# Patient Record
(demographics unavailable — no encounter records)

---

## 2024-11-14 NOTE — ASSESSMENT
[FreeTextEntry1] : CARLY is a 18 month old boy presenting for noisy breathing, laryngomalacia, stridor, vascular ring? dysphagia to solids CXR Mag Airway Airway Fluoro within normal limits 9/20/24  laryngomalacia persistent, stridor present worse with exertion - scheduled for possible endoscopic intervention possible supraglottoplasty (SDA PCP)  - imaging options discussed - supraglottoplasty at time of DLB if no other significant treatable pathology found - recording with dysphagia to solids with obvious choking, plan for esophagram as symptoms now supportive of possible vascular ring  Consent for Direct Laryngoscopy and Bronchoscopy The risks, benefits and alternatives of direct laryngoscopy and bronchoscopy were discussed. Risks including but not limited to pain, bleeding, swelling, infection, scarring damage to lips, teeth, gums, parts of the oral cavity, oropharynx and airway, risk for further procedures, and risk of anesthesia (which will be discussed with you by the anesthesiologist).  Benefits include the diagnosis or surveillance of an underlying condition and treatment of an underlying condition. Alternatives include observation, and other diagnostic studies. During observation, if there is an underlying condition there is a risk that it could progress. Photodocumentation including pictures and video with or without sound may be taken, and effort will be made to maintain respect for privacy and confidentiality.

## 2024-11-14 NOTE — CONSULT LETTER
[Dear  ___] : Dear  [unfilled], [Courtesy Letter:] : I had the pleasure of seeing your patient, [unfilled], in my office today. [Please see my note below.] : Please see my note below. [Consult Closing:] : Thank you very much for allowing me to participate in the care of this patient.  If you have any questions, please do not hesitate to contact me. [Sincerely,] : Sincerely, [FreeTextEntry2] : Dr. Kyle Monsalve  [address] [place] [address] [phone] [FreeTextEntry3] : Jazmin Reyna MD Pediatric Otolaryngology / Head and Neck Surgery    [facility] [address] [place] [address] Tel [phone] Fax [phone]    [facility], [facility] [address] [place] [address]  Tel [phone] Fax [phone]

## 2024-11-14 NOTE — PHYSICAL EXAM
[2+] : 2+ [Inspriatory] : inspiratory stridor [Normal Gait and Station] : normal gait and station [Normal muscle strength, symmetry and tone of facial, head and neck musculature] : normal muscle strength, symmetry and tone of facial, head and neck musculature [Normal] : no cervical lymphadenopathy [Effusion] : no effusion [Exposed Vessel] : left anterior vessel not exposed [Increased Work of Breathing] : no increased work of breathing with use of accessory muscles and retractions

## 2024-11-14 NOTE — HISTORY OF PRESENT ILLNESS
[de-identified] : Today I had the pleasure of seeing CARLY BLUE for follow up.  History was obtained from patient, mother and chart.  CXR flouro and mag airway 9/20/24 within normal limits  Scheduled for DLB possible endoscopic intervention possible supraglottoplasty 12/23/24 [de-identified] : sleeping okay, sound is improving, worse when walking too much or running too much, no recent illness, no cyanosis or retractions no ear infections not sure if snoring difficulty with swallowing solids, needs water to help it, no issues with drinking

## 2024-12-07 NOTE — HISTORY OF PRESENT ILLNESS
[de-identified] : 19 month old male with laryngomalacia presents for evaluation of feeding problems.  Referred by Dr. Reyna, ENT.  Normal esophagram. 4/23 supraglottoplasty at time of DLB. Notes that he is sucking on fruit but wouldn't chew and swallow.  After all the juice has been sucked out of the fruit he spits out the fibrous product without attempting to swallow. Has a pronounced swallow, where it appears that he is requiring extra effort to push the food bolus down.  Can eat a peeled tomato, peeled grapes, avocado, banana, berries, oranges. Pockets bread in front of mouth, as well as pasta, rice, lentils, fries, cookies. Ingests smoothies and purrees. Uses a lot of water with eating and whole milk. Multiple teeth. Doesn't appear painful to chew. No emesis. No choking, coughing or gagging with solids or liquids. No recurrent infections, URI, PNA, abx use. No constipation or diarrhea no visible blood or mucous. Good UOP.

## 2024-12-07 NOTE — HISTORY OF PRESENT ILLNESS
[de-identified] : 19 month old male with laryngomalacia presents for evaluation of feeding problems.  Referred by Dr. Reyna, ENT.  Normal esophagram. 4/23 supraglottoplasty at time of DLB. Notes that he is sucking on fruit but wouldn't chew and swallow.  After all the juice has been sucked out of the fruit he spits out the fibrous product without attempting to swallow. Has a pronounced swallow, where it appears that he is requiring extra effort to push the food bolus down.  Can eat a peeled tomato, peeled grapes, avocado, banana, berries, oranges. Pockets bread in front of mouth, as well as pasta, rice, lentils, fries, cookies. Ingests smoothies and purrees. Uses a lot of water with eating and whole milk. Multiple teeth. Doesn't appear painful to chew. No emesis. No choking, coughing or gagging with solids or liquids. No recurrent infections, URI, PNA, abx use. No constipation or diarrhea no visible blood or mucous. Good UOP.

## 2024-12-07 NOTE — CONSULT LETTER
[Dear  ___] : Dear  [unfilled], [Consult Letter:] : I had the pleasure of evaluating your patient, [unfilled]. [Please see my note below.] : Please see my note below. [Consult Closing:] : Thank you very much for allowing me to participate in the care of this patient.  If you have any questions, please do not hesitate to contact me. [Sincerely,] : Sincerely, [DrClark  ___] : Dr. KWAN [FreeTextEntry3] : Abilio Ortega DO, MSc   of Comprehensive Airway, Respiratory and Esophageal Team Division of Pediatric Gastroenterology, Liver Disease and Nutrition [facility]

## 2024-12-07 NOTE — HISTORY OF PRESENT ILLNESS
[de-identified] : 19 month old male with laryngomalacia presents for evaluation of feeding problems.  Referred by Dr. Reyna, ENT.  Normal esophagram. 4/23 supraglottoplasty at time of DLB. Notes that he is sucking on fruit but wouldn't chew and swallow.  After all the juice has been sucked out of the fruit he spits out the fibrous product without attempting to swallow. Has a pronounced swallow, where it appears that he is requiring extra effort to push the food bolus down.  Can eat a peeled tomato, peeled grapes, avocado, banana, berries, oranges. Pockets bread in front of mouth, as well as pasta, rice, lentils, fries, cookies. Ingests smoothies and purrees. Uses a lot of water with eating and whole milk. Multiple teeth. Doesn't appear painful to chew. No emesis. No choking, coughing or gagging with solids or liquids. No recurrent infections, URI, PNA, abx use. No constipation or diarrhea no visible blood or mucous. Good UOP.

## 2024-12-07 NOTE — CONSULT LETTER
[Dear  ___] : Dear  [unfilled], [Consult Letter:] : I had the pleasure of evaluating your patient, [unfilled]. [Please see my note below.] : Please see my note below. [Consult Closing:] : Thank you very much for allowing me to participate in the care of this patient.  If you have any questions, please do not hesitate to contact me. [Sincerely,] : Sincerely, [DrClark  ___] : Dr. KWAN [FreeTextEntry3] : Abilio Ortega DO, MSc   of Comprehensive Airway, Respiratory and Esophageal Team Division of Pediatric Gastroenterology, Liver Disease and Nutrition [facility]

## 2024-12-07 NOTE — ASSESSMENT
[Educated Patient & Family about Diagnosis] : educated the patient and family about the diagnosis [FreeTextEntry1] : 19 month old male with laryngomalacia presents for evaluation of feeding problems that may be related to oropharyngeal dysphagia; however, esophagitis should be excluded.  Referred by Dr. Reyna, ENT.  Recommend: -Clinical swallow evaluation today -EGD with biopsies at time of intervention with Dr. Reyna in OR -Call with questions, concerns, or clinical change -Follow-up dependent upon timeline to procedure and results of EGD

## 2024-12-07 NOTE — CONSULT LETTER
[Dear  ___] : Dear  [unfilled], [Consult Letter:] : I had the pleasure of evaluating your patient, [unfilled]. [Please see my note below.] : Please see my note below. [Consult Closing:] : Thank you very much for allowing me to participate in the care of this patient.  If you have any questions, please do not hesitate to contact me. [Sincerely,] : Sincerely, [DrClark  ___] : Dr. KWAN [FreeTextEntry3] : Abilio Ortega DO, MSc   of Comprehensive Airway, Respiratory and Esophageal Team Division of Pediatric Gastroenterology, Liver Disease and Nutrition [facility]a [facility]

## 2024-12-10 NOTE — ASSESSMENT
[FreeTextEntry1] : PEDIATRIC CLINICAL SWALLOW EVALUATION  Type of Evaluation & Procedure Code: Evaluation of Oral and Pharyngeal Swallow CPT 53563   Patient Name: Dickson Lemus   D.O.B: 23  Date of Evaluation: 24  Referring Physician: Dr. Jazmin Reyna   Referring Physician Specialty: Otolaryngologist   Medical Diagnosis: Laryngomalacia Q31.5  Treatment Diagnosis: Oropharyngeal Dysphagia (R13.12)  REASON FOR REFERRAL: Dickson Lemus, a 19 month old male was referred by Otolaryngologist, Dr. Jazmin Reyna for a Clinical Swallow Evaluation to further assess oral and pharyngeal swallow as parent reported difficulties with solids. Patient was accompanied by parents who provided the case history information and served as reliable informants.    PRIMARY LANGUAGE:  Reported Primary Language: English   BIRTH & MEDICAL HISTORY: Birth and medical history was gathered via caregiver interview and review of electronic medical record. Patient was born full term via ceserean delivery. Born at [facility] however transferred to [facility] NICU for 10 days due to episodes of cyanosis. [place] Hearing Screening passed. Patient's medical history is significant for noisy breathing, laryngomalacia, stridor. Per ENT appointment on 24, patient is scheduled for DLB possible endoscopic intervention possible supraglottoplasty 24.  Per GI appointment on 24, reported "normal esophagram". Denies any recent hospitalization/surgeries. Denies any recent URIs/pneumonia diagnoses.    Medical Allergies: None reported.  Food Allergies: None reported    Current respiratory status: room air    Results of Previous Clinical Swallow Evaluations:   None reported    Results of Most Recent Modified Barium Swallow Study (MBSS)/Videofluoroscopic Swallow Studies (VFSS):   None reported    DEVELOPMENTAL MILESTONES: Per parent report, patient is not participating in any therapeutic interventions.    FEEDING HISTORY:    Current Diet (based on the International Dysphagia Diet Standardization initiative [IDDSI]):  Oral diet of age appropriate solids (IDDSI Level 7) and Thin Liquids (IDDSI Level 0) via straw cup (water) and cow's milk via pela bottle via Level 4 nipple. Reported in home environment, patient will suck on soft food items until they're very soft (i.e. oranges, berries, grapes, rice, French fries). With hard/crunchy textures, parents reported seeing mastication and patient will take anterior bites. Reported pocketing and overstuffing with solids. Denies any coughing with solids/liquids. Denies any URIs/pneumonia diagnoses.    Feeding Method: Some assistance needed   Reported Endurance During Meals: Good  Feeding utensils: Age- appropriate feeding utensils  Feeding schedule: Full meals and snacks throughout the day  Duration of meal time: 5-20 minutes   ASSESSMENT  Mental status: alert and responsive  Head Control: WFL  Trunk Control: WFL   ORAL MOTOR ASSESSMENT:   A cursory oral mechanism examination of was conducted  Patient presented with facial symmetry and a predominantly open mouth posture while at rest.  Dentition: Within functional limits for age  Oral Mucosa: Moist Buccal: Within functional limits  Laryngeal: Within functional limits  Palate: Within functional limits  Labial: Within functional limits  Lingual: Within functional limits  Grooving/cupping of tongue to finger (dry spoon): Within functional limits  Testing Tongue Position: Within functional limits  Oral Sensory: Within functional limits  Vocal Quality was perceptually judged to be within functional limits  Gag reflex: At this time, clinician did not elicit gag reflex.   ASSESSMENT:  Testing position: upright in [place] transition chair in the [facility] therapy suite.    Current Respiratory Status:Room air	  Secretion Management: Adequate   Patient was alert and cooperative.    Behavioral Observations: Alert and cooperative   Feeding Assessment:  Consistencies Administered:   -Purees (IDDSI Level 4)  -Minced & Moist (IDDSI Level 5)  -Age appropriate solids (IDDSI Level 7)   -Feeding method: Some assistance provided -Endurance during trials: Good  -Patient required minimal to moderate encouragement/praise to complete testing/evaluation.  -Behavioral Observations: Alert & cooperative  -There was no wet gurly vocal quality, throat clearing, coughing prior to PO administration   Oral Preparatory Phase: Patient required minimal to moderate encouragement to participate in solid trials. Video reinforcements were utilized during today's session. When presented with puree trials, patient with age appropriate spoon feeding skills marked by adequate stripping of spoon. When presented with minced & moist solids and age appropriate solids from home, patient demonstrated a mix of vertical and rotary chewing pattern. Anterior bites noted with intermittent bolus holding observed. Visual/tactile cues provided to cheeks to continue chewing. Improvements noted with mastication. Pocketing and overstuffing observed at times, clinician provided support for patient to eat one piece at a time.    Oral Phase: Timely bolus manipulation with timely anterior posterior transit time with adequate oral clearance of purees. Uncoordinated lingual movements noted with scattered bolus for age-appropriate solids however functional.    Pharyngeal Phase: Timely pharyngeal swallow initiation and motor response appreciated with adequate hyolaryngeal elevation/excursion via digital palpation. No overt signs of aspiration/penetration observed with purees and age appropriate solids. No cardiopulmonary changes noted. Patient's vocal quality clear pre- and post- trials.     Liquid Consistencies Administered:   -Thin Liquids via straw and open cup   -Feeding method: Requires some assistance   -Endurance during trials: Good  -Patient required minimal encouragement/praise to complete testing/evaluation.  -Behavioral Observations: Alert & cooperative  -There was no wet gurly vocal quality, throat clearing, coughing prior to PO administration   Oral Preparatory Phase: When presented with Thin Liquids via straw, patient demonstrated age appropriate straw drinking skills demonstrated by adequate expression of liquid through straw. With open cup, patient with reduced oral grading and reduced jaw stability and required some assistance from parent for controlled sips.    Oral Phase: Timely anterior posterior movement of bolus with timely oral transit time and adequate clearance of bolus from oral cavity for Thin Liquids.   Pharyngeal Phase: Timely pharyngeal swallow initiation and motor response appreciated with adequate hyolaryngeal elevation/excursion via digital palpation. No overt signs of penetration/aspiration observed when drinking Thin Liquids via straw/open cup. No cardiopulmonary changes noted.    Feeding/Swallowing Impact Survey (FS-IS):   Patient's parent completed the Feeding/Swallowing Impact Survey (FS-IS) to measure the impact of feeding/swallowing problems in children on their caregivers. The three subscales of the FS-IS include Daily Activities, Worry, and Feeding Difficulties. Maximum score is 100 for each subscale and the total score with a lower score indicating less impact on quality of life. The patient's parents reported a score of 58.    Parent reported 'very often' for the following statements:  I am too tired to do the things I want or need to do.  I worry about my child's general health. I worry that my child does not get enough to eat/drink. I worry that my child will never eat/drink like other children.  it is hard to feed my child because it takes a long time to prepare liquids and foods the 'right' way.   SIRIA Land., Sly S. ILEANA., TANG Mcfarlane., TANG Terrazas., YULIANA Robin., & SIRIA Liu. (2014). Impact of children's feeding/swallowing problems: validation of a new caregiver instrument. Dysphagia, 296), 671-677. https://doi.org/10.1007/w93308-975-6090-5   PROGNOSIS:   Prognosis is good for safe and adequate oral intake of the recommended consistencies as outlined below, based on results of today's assessment and medical history reported.   Prognosis is good with therapeutic intervention, parent involvement, caregiver involvement, patient involvement.    EDUCATION:   Discussed results of evaluation with patient's caregivers    Patient's caregiver expressed understanding of evaluation and agreement with goals and treatment plan  Patient's caregiver expressed understanding of safety precautions/feeding recommendations  Patient's caregivers demonstrated appropriate incorporation of recommended strategies   IMPRESSIONS: Dickson Lemus, a 19 month old male was referred by Otolaryngologist, Dr. Jazmin Reyna for a Clinical Swallow Evaluation to further assess oral and pharyngeal swallow as parent reported difficulties with solids. Oral stage deficits of solids marked by emerging mastication pattern, intermittent bolus holding, overstuffing and pocketing food. Oral stage deficits of liquids marked by emerging open cup drinking skills. Timely pharyngeal swallow initiation and motor response appreciated with adequate hyolaryngeal elevation/excursion via digital palpation. No overt signs of penetration/aspiration with purees, Solids and Thin Liquids. Recommend to initiate feeding therapy to address oral stage deficits. Recommend continuation of oral diet of age appropriate solids (IDDSI Level 7) and Thin Liquids (IDDSI Level 0).    Diet/Liquid Recommended Consistencies: Recommend continuation of oral diet of age appropriate solids (IDDSI Level 7) and Thin Liquids (IDDSI Level 0).   ADDITIONAL RECOMMENDATIONS:  1. Initiate feeding therapy (CPT 03929) at [facility] for 6-8 sessions to address oral stage deficits. Parent declined services at this time; provided Early Intervention handout and referral list for places in patient's area   2. Monitor for signs and symptoms of aspiration and/or laryngeal penetration, such as change of breathing pattern, cough, throat clearing, gurgly/wet voice, color change, fever, pneumonia, and upper respiratory infection. If noted: discontinue oral intake and contact physician immediately.   3. Continue to follow-up with all established providers.   This referral process was reviewed with the parent. No further recommendations were made at this time. Please feel free to contact the Center at [phone], if any additional information is needed.   Brittni James M.S., CCC-SLP, [facility], [place]  [facility] #[id number]

## 2024-12-10 NOTE — ASSESSMENT
[FreeTextEntry1] : PEDIATRIC CLINICAL SWALLOW EVALUATION  Type of Evaluation & Procedure Code: Evaluation of Oral and Pharyngeal Swallow CPT 46025   Patient Name: Dickson Lemus   D.O.B: 23  Date of Evaluation: 24  Referring Physician: Dr. Jazmin Reyna   Referring Physician Specialty: Otolaryngologist   Medical Diagnosis: Laryngomalacia Q31.5  Treatment Diagnosis: Oropharyngeal Dysphagia (R13.12)  REASON FOR REFERRAL: Dickson Lemus, a 19 month old male was referred by Otolaryngologist, Dr. Jazmin Reyna for a Clinical Swallow Evaluation to further assess oral and pharyngeal swallow as parent reported difficulties with solids. Patient was accompanied by parents who provided the case history information and served as reliable informants.    PRIMARY LANGUAGE:  Reported Primary Language: English   BIRTH & MEDICAL HISTORY: Birth and medical history was gathered via caregiver interview and review of electronic medical record. Patient was born full term via ceserean delivery. [facility] however transferred to [facility] NICU for 10 days due to episodes of cyanosis. [place] [facility] Screening passed. Patient's medical history is significant for noisy breathing, laryngomalacia, stridor. Per ENT appointment on 24, patient is scheduled for DLB possible endoscopic intervention possible supraglottoplasty 24.  Per GI appointment on 24, reported "normal esophagram". Denies any recent hospitalization/surgeries. Denies any recent URIs/pneumonia diagnoses.    Medical Allergies: None reported.  Food Allergies: None reported    Current respiratory status: room air    Results of Previous Clinical Swallow Evaluations:   None reported    Results of Most Recent Modified Barium Swallow Study (MBSS)/Videofluoroscopic Swallow Studies (VFSS):   None reported    DEVELOPMENTAL MILESTONES: Per parent report, patient is not participating in any therapeutic interventions.    FEEDING HISTORY:    Current Diet (based on the International Dysphagia Diet Standardization initiative [IDDSI]):  Oral diet of age appropriate solids (IDDSI Level 7) and Thin Liquids (IDDSI Level 0) via straw cup (water) and cow's milk via pela bottle via Level 4 nipple. Reported in home environment, patient will suck on soft food items until they're very soft (i.e. oranges, berries, grapes, rice, French fries). With hard/crunchy textures, parents reported seeing mastication and patient will take anterior bites. Reported pocketing and overstuffing with solids. Denies any coughing with solids/liquids. Denies any URIs/pneumonia diagnoses.    Feeding Method: Some assistance needed   Reported Endurance During Meals: Good  Feeding utensils: Age- appropriate feeding utensils  Feeding schedule: Full meals and snacks throughout the day  Duration of meal time: 5-20 minutes   ASSESSMENT  Mental status: alert and responsive  Head Control: WFL  Trunk Control: WFL   ORAL MOTOR ASSESSMENT:   A cursory oral mechanism examination of was conducted  Patient presented with facial symmetry and a predominantly open mouth posture while at rest.  Dentition: Within functional limits for age  Oral Mucosa: Moist Buccal: Within functional limits  Laryngeal: Within functional limits  Palate: Within functional limits  Labial: Within functional limits  Lingual: Within functional limits  Grooving/cupping of tongue to finger (dry spoon): Within functional limits  Testing Tongue Position: Within functional limits  Oral Sensory: Within functional limits  Vocal Quality was perceptually judged to be within functional limits  Gag reflex: At this time, clinician did not elicit gag reflex.   ASSESSMENT:  Testing position: upright in [place] transition chair in the [facility] therapy suite.    Current Respiratory Status:Room air	  Secretion Management: Adequate   Patient was alert and cooperative.    Behavioral Observations: Alert and cooperative   Feeding Assessment:  Consistencies Administered:   -Purees (IDDSI Level 4)  -Minced & Moist (IDDSI Level 5)  -Age appropriate solids (IDDSI Level 7)   -Feeding method: Some assistance provided -Endurance during trials: Good  -Patient required minimal to moderate encouragement/praise to complete testing/evaluation.  -Behavioral Observations: Alert & cooperative  -There was no wet gurly vocal quality, throat clearing, coughing prior to PO administration   Oral Preparatory Phase: Patient required minimal to moderate encouragement to participate in solid trials. Video reinforcements were utilized during today's session. When presented with puree trials, patient with age appropriate spoon feeding skills marked by adequate stripping of spoon. When presented with minced & moist solids and age appropriate solids from home, patient demonstrated a mix of vertical and rotary chewing pattern. Anterior bites noted with intermittent bolus holding observed. Visual/tactile cues provided to cheeks to continue chewing. Improvements noted with mastication. Pocketing and overstuffing observed at times, clinician provided support for patient to eat one piece at a time.    Oral Phase: Timely bolus manipulation with timely anterior posterior transit time with adequate oral clearance of purees. Uncoordinated lingual movements noted with scattered bolus for age-appropriate solids however functional.    Pharyngeal Phase: Timely pharyngeal swallow initiation and motor response appreciated with adequate hyolaryngeal elevation/excursion via digital palpation. No overt signs of aspiration/penetration observed with purees and age appropriate solids. No cardiopulmonary changes noted. Patient's vocal quality clear pre- and post- trials.     Liquid Consistencies Administered:   -Thin Liquids via straw and open cup   -Feeding method: Requires some assistance   -Endurance during trials: Good  -Patient required minimal encouragement/praise to complete testing/evaluation.  -Behavioral Observations: Alert & cooperative  -There was no wet gurly vocal quality, throat clearing, coughing prior to PO administration   Oral Preparatory Phase: When presented with Thin Liquids via straw, patient demonstrated age appropriate straw drinking skills demonstrated by adequate expression of liquid through straw. With open cup, patient with reduced oral grading and reduced jaw stability and required some assistance from parent for controlled sips.    Oral Phase: Timely anterior posterior movement of bolus with timely oral transit time and adequate clearance of bolus from oral cavity for Thin Liquids.   Pharyngeal Phase: Timely pharyngeal swallow initiation and motor response appreciated with adequate hyolaryngeal elevation/excursion via digital palpation. No overt signs of penetration/aspiration observed when drinking Thin Liquids via straw/open cup. No cardiopulmonary changes noted.    Feeding/Swallowing Impact Survey (FS-IS):   Patient's parent completed the Feeding/Swallowing Impact Survey (FS-IS) to measure the impact of feeding/swallowing problems in children on their caregivers. The three subscales of the FS-IS include Daily Activities, Worry, and Feeding Difficulties. Maximum score is 100 for each subscale and the total score with a lower score indicating less impact on quality of life. The patient's parents reported a score of 58.    Parent reported 'very often' for the following statements:  I am too tired to do the things I want or need to do.  I worry about my child's general health. I worry that my child does not get enough to eat/drink. I worry that my child will never eat/drink like other children.  it is hard to feed my child because it takes a long time to prepare liquids and foods the 'right' way.   SIRIA Land., Sly S. ILEANA., TANG Mcfarlane., TANG Terrazas., YULIANA Robin., & SIRIA Liu. (2014). Impact of children's feeding/swallowing problems: validation of a new caregiver instrument. Dysphagia, 296), 671-677. https://doi.org/10.1007/[email or web address]   PROGNOSIS:   Prognosis is good for safe and adequate oral intake of the recommended consistencies as outlined below, based on results of today's assessment and medical history reported.   Prognosis is good with therapeutic intervention, parent involvement, caregiver involvement, patient involvement.    EDUCATION:   Discussed results of evaluation with patient's caregivers    Patient's caregiver expressed understanding of evaluation and agreement with goals and treatment plan  Patient's caregiver expressed understanding of safety precautions/feeding recommendations  Patient's caregivers demonstrated appropriate incorporation of recommended strategies   IMPRESSIONS: Dickson Lemus, a 19 month old male was referred by Otolaryngologist, Dr. Jazmin Reyna for a Clinical Swallow Evaluation to further assess oral and pharyngeal swallow as parent reported difficulties with solids. Oral stage deficits of solids marked by emerging mastication pattern, intermittent bolus holding, overstuffing and pocketing food. Oral stage deficits of liquids marked by emerging open cup drinking skills. Timely pharyngeal swallow initiation and motor response appreciated with adequate hyolaryngeal elevation/excursion via digital palpation. No overt signs of penetration/aspiration with purees, Solids and Thin Liquids. Recommend to initiate feeding therapy to address oral stage deficits. Recommend continuation of oral diet of age appropriate solids (IDDSI Level 7) and Thin Liquids (IDDSI Level 0).    Diet/Liquid Recommended Consistencies: Recommend continuation of oral diet of age appropriate solids (IDDSI Level 7) and Thin Liquids (IDDSI Level 0).   ADDITIONAL RECOMMENDATIONS:  1. Initiate feeding therapy (CPT 04074) at [facility] for 6-8 sessions to address oral stage deficits. Parent declined services at this time; provided Early Intervention handout and referral list for places in patient's area   2. Monitor for signs and symptoms of aspiration and/or laryngeal penetration, such as change of breathing pattern, cough, throat clearing, gurgly/wet voice, color change, fever, pneumonia, and upper respiratory infection. If noted: discontinue oral intake and contact physician immediately.   3. Continue to follow-up with all established providers.   This referral process was reviewed with the parent. No further recommendations were made at this time. Please feel free to contact the Center at [phone], if any additional information is needed.   Brittni James M.S., CCC-SLP, [facility], [facility]  [facility] #[id number]

## 2025-01-30 NOTE — HISTORY OF PRESENT ILLNESS
[de-identified] : Today I had the pleasure of seeing CARLY for post op evaluation.      CARLY is now s/p Direct laryngoscopy, bronchoscopy, supraglottoplasty, concurrent EGD with Dr. Macias 12/23/2024 DLB:  Airway is a grade 1 view following the release of the laryngomalacia.  Prior to release, airway was a grade 2B view due to the significant retroflexion of the epiglottis.  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  Right vocal fold normal, mobility not assessed.  Left vocal fold normal, mobility not assessed.  Subglottis patent.  Trachea normal.  Kadi normal.  Bilateral mainstem bronchi normal.  Very mild cobblestoning noted in the trachea Laryngeal cleft  absent.  Airway sized with a 3.5 uncuffed endotracheal tube with leak absent at the start of the procedure; however, this improved significantly following the release of the laryngomalacia CXR flouro and mag airway 9/20/24 within normal limits GI- Dr. Ortega [de-identified] : No noisy breathing Tolerating solids better, drinking better and tolerating more volume, weight improving No snoring, sleep is much better  Had first ear infection over 2 weeks ago treated with Amoxicillin  No concerns for hearing No speech delay

## 2025-01-30 NOTE — PHYSICAL EXAM
[Effusion] : effusion [Exposed Vessel] : left anterior vessel not exposed [2+] : 2+ [Increased Work of Breathing] : no increased work of breathing with use of accessory muscles and retractions [Inspriatory] : inspiratory stridor [Normal Gait and Station] : normal gait and station [Normal muscle strength, symmetry and tone of facial, head and neck musculature] : normal muscle strength, symmetry and tone of facial, head and neck musculature [Normal] : no cervical lymphadenopathy

## 2025-01-30 NOTE — ASSESSMENT
[FreeTextEntry1] : CARLY is a 21 month old boy presenting for noisy breathing, laryngomalacia, stridor, vascular ring? dysphagia to solids CXR Mag Airway Airway Fluoro within normal limits 9/20/24 s/p DLB supraglottoplasty concurrent EGD Dr Macias 12/23/24  laryngomalacia and stridor resolved - follow up as needed   left serous effusion recent AOM, return if persists >3 months

## 2025-01-30 NOTE — CONSULT LETTER
[Dear  ___] : Dear  [unfilled], [Courtesy Letter:] : I had the pleasure of seeing your patient, [unfilled], in my office today. [Please see my note below.] : Please see my note below. [Consult Closing:] : Thank you very much for allowing me to participate in the care of this patient.  If you have any questions, please do not hesitate to contact me. [Sincerely,] : Sincerely, [FreeTextEntry2] : Dr. Kyle Monsalve  [address] [place] [address]  [phone] [FreeTextEntry3] : Jazmin Reyna MD Pediatric Otolaryngology / Head and Neck Surgery    [facility] [address] [place] [address] Tel [phone] Fax [phone]    [address] [facility], [facility] [address] [place] [address]  Tel [phone] Fax [phone]